# Patient Record
Sex: FEMALE | URBAN - METROPOLITAN AREA
[De-identification: names, ages, dates, MRNs, and addresses within clinical notes are randomized per-mention and may not be internally consistent; named-entity substitution may affect disease eponyms.]

---

## 2020-11-16 ENCOUNTER — OFFICE VISIT (OUTPATIENT)
Dept: URBAN - METROPOLITAN AREA CLINIC 86 | Facility: CLINIC | Age: 50
End: 2020-11-16
Payer: COMMERCIAL

## 2020-11-16 DIAGNOSIS — H35.371 PUCKERING OF MACULA, RIGHT EYE: Primary | ICD-10-CM

## 2020-11-16 PROCEDURE — 92134 CPTRZ OPH DX IMG PST SGM RTA: CPT | Performed by: OPHTHALMOLOGY

## 2020-11-16 PROCEDURE — 99204 OFFICE O/P NEW MOD 45 MIN: CPT | Performed by: OPHTHALMOLOGY

## 2020-11-16 ASSESSMENT — INTRAOCULAR PRESSURE
OS: 15
OD: 18

## 2020-11-16 NOTE — IMPRESSION/PLAN
Impression: Age-related nuclear cataract, bilateral: H25.13 Bilateral. Plan: Discussed risk of progression after PPV.

## 2020-11-16 NOTE — IMPRESSION/PLAN
Impression: Puckering of macula, right eye: H35.371 Right. OCT:
OD: Dense ERM, significant macular thickening OS: WNL Plan: Examination and OCT reveals an ERM which is distorting the macular contour. The diagnosis, natural history, and prognosis of epiretinal membranes, as well as the risks and benefits of vitrectomy with membrane peeling versus observation were discussed at length. Given the patient's current visual acuity and hindrance on activities of daily living, surgical correction was recommended. The patient understands that while the distortion should christo, the final acuity, which can take months to achieve, may or may not be an improvement over baseline. 

Rec: 25g PPV, ERM-ILM Peel, IVK OD

## 2020-11-16 NOTE — IMPRESSION/PLAN
Impression: Benign neoplasm of right choroid: D31.31 Right. Plan: Examination reveals a pigmented choroidal lesion. There is no evidence of subretinal fluid, significant thickening, lipofucin or other signs associated with malignancy. The most likely diagnosis is choroidal nevus which are present in 6% of the  population. The diagnosis, natural history, and prognosis of a flat choroidal nevus were discussed at length.   Pt educated that there is a small risk of progression into malignant melanoma; stressed the importance of yearly dilated eye exam.

## 2021-12-27 ENCOUNTER — OFFICE VISIT (OUTPATIENT)
Dept: URBAN - METROPOLITAN AREA CLINIC 86 | Facility: CLINIC | Age: 51
End: 2021-12-27
Payer: COMMERCIAL

## 2021-12-27 DIAGNOSIS — H43.811 VITREOUS DEGENERATION, RIGHT EYE: ICD-10-CM

## 2021-12-27 DIAGNOSIS — D31.31 BENIGN NEOPLASM OF RIGHT CHOROID: ICD-10-CM

## 2021-12-27 DIAGNOSIS — H25.13 AGE-RELATED NUCLEAR CATARACT, BILATERAL: ICD-10-CM

## 2021-12-27 PROCEDURE — 99214 OFFICE O/P EST MOD 30 MIN: CPT | Performed by: OPHTHALMOLOGY

## 2021-12-27 PROCEDURE — 92134 CPTRZ OPH DX IMG PST SGM RTA: CPT | Performed by: OPHTHALMOLOGY

## 2021-12-27 ASSESSMENT — INTRAOCULAR PRESSURE
OS: 16
OD: 12

## 2021-12-27 NOTE — IMPRESSION/PLAN
Impression: Puckering of macula, right eye: H35.371 Right. OCT:
OD: Dense ERM, significant macular thickening, no MH, no ME
OS: WNL Plan: Examination and OCT reveals an ERM which is distorting the macular contour. The diagnosis, natural history, and prognosis of epiretinal membranes, as well as the risks and benefits of vitrectomy with membrane peeling versus observation were discussed at length. Given the patient's current visual acuity and hindrance on activities of daily living, surgical correction was recommended. The patient understands that while the distortion should christo, the final acuity, which can take months to achieve, may or may not be an improvement over baseline. 

Rec: 25g PPV, ERM-ILM Peel, IVK OD

## 2022-03-22 ENCOUNTER — Encounter (OUTPATIENT)
Dept: URBAN - METROPOLITAN AREA EXTERNAL CLINIC 14 | Facility: EXTERNAL CLINIC | Age: 52
End: 2022-03-22
Payer: COMMERCIAL

## 2022-03-22 PROCEDURE — 67042 VIT FOR MACULAR HOLE: CPT | Performed by: OPHTHALMOLOGY

## 2022-03-23 ENCOUNTER — POST-OPERATIVE VISIT (OUTPATIENT)
Dept: URBAN - METROPOLITAN AREA CLINIC 7 | Facility: CLINIC | Age: 52
End: 2022-03-23
Payer: COMMERCIAL

## 2022-03-23 PROCEDURE — 99024 POSTOP FOLLOW-UP VISIT: CPT | Performed by: OPHTHALMOLOGY

## 2022-03-23 ASSESSMENT — INTRAOCULAR PRESSURE
OD: 12
OS: 16

## 2022-03-23 NOTE — IMPRESSION/PLAN
Impression: S/P 25g PPV, ERM-ILM peel, IVK OD - 1 Day. Puckering of macula, right eye  H35.371. Plan: PF/Oflox QID OD.  

RTC 1 weeks DFE POS OD

## 2022-04-01 ENCOUNTER — POST-OPERATIVE VISIT (OUTPATIENT)
Dept: URBAN - METROPOLITAN AREA CLINIC 87 | Facility: CLINIC | Age: 52
End: 2022-04-01
Payer: COMMERCIAL

## 2022-04-01 PROCEDURE — 99024 POSTOP FOLLOW-UP VISIT: CPT | Performed by: OPHTHALMOLOGY

## 2022-04-01 ASSESSMENT — INTRAOCULAR PRESSURE
OS: 18
OD: 22

## 2022-04-01 NOTE — IMPRESSION/PLAN
Impression: S/P 25g PPV, ERM-ILM peel, IVK OD - 10 Days. Puckering of macula, right eye  H35.371. Plan: Taper drops.  

RTC 1 month DFE OD OCT OD

## 2022-05-06 ENCOUNTER — POST-OPERATIVE VISIT (OUTPATIENT)
Dept: URBAN - METROPOLITAN AREA CLINIC 86 | Facility: CLINIC | Age: 52
End: 2022-05-06
Payer: COMMERCIAL

## 2022-05-06 DIAGNOSIS — H35.371 PUCKERING OF MACULA, RIGHT EYE: Primary | ICD-10-CM

## 2022-05-06 PROCEDURE — 99024 POSTOP FOLLOW-UP VISIT: CPT | Performed by: OPHTHALMOLOGY

## 2022-05-06 ASSESSMENT — INTRAOCULAR PRESSURE
OS: 15
OD: 19

## 2022-05-06 NOTE — IMPRESSION/PLAN
Impression: S/P 25g PPV, ERM-ILM peel, IVK OD - 45 Days. Puckering of macula, right eye  H35.371. OCT OD: 
s/p ERM peel, tr IRF Plan: Off drops. [id number] Sarah Epstein for [facility] RTC 3 months DFE OU OCT OU

## 2022-08-12 ENCOUNTER — OFFICE VISIT (OUTPATIENT)
Dept: URBAN - METROPOLITAN AREA CLINIC 86 | Facility: CLINIC | Age: 52
End: 2022-08-12
Payer: COMMERCIAL

## 2022-08-12 DIAGNOSIS — H35.371 PUCKERING OF MACULA, RIGHT EYE: Primary | ICD-10-CM

## 2022-08-12 DIAGNOSIS — H25.13 AGE-RELATED NUCLEAR CATARACT, BILATERAL: ICD-10-CM

## 2022-08-12 DIAGNOSIS — D31.31 BENIGN NEOPLASM OF RIGHT CHOROID: ICD-10-CM

## 2022-08-12 PROCEDURE — 92134 CPTRZ OPH DX IMG PST SGM RTA: CPT | Performed by: OPHTHALMOLOGY

## 2022-08-12 PROCEDURE — 99213 OFFICE O/P EST LOW 20 MIN: CPT | Performed by: OPHTHALMOLOGY

## 2022-08-12 ASSESSMENT — INTRAOCULAR PRESSURE
OS: 17
OD: 20

## 2022-08-12 NOTE — IMPRESSION/PLAN
Impression: Puckering of macula, right eye  H35.371.
-S/P 25g PPV, ERM-ILM peel, IVK OD 

OCT 
OD: s/p ERM peel, tr IRF
OS: wnl Plan: Off drops. Improving macular thickening. Continue to observe.  

RTC 6 months DFE OU OCT OU